# Patient Record
Sex: MALE | URBAN - METROPOLITAN AREA
[De-identification: names, ages, dates, MRNs, and addresses within clinical notes are randomized per-mention and may not be internally consistent; named-entity substitution may affect disease eponyms.]

---

## 2022-05-16 PROCEDURE — 81001 URINALYSIS AUTO W/SCOPE: CPT

## 2022-05-16 PROCEDURE — 87086 URINE CULTURE/COLONY COUNT: CPT

## 2022-05-17 ENCOUNTER — LAB REQUISITION (OUTPATIENT)
Dept: LAB | Facility: CLINIC | Age: 72
End: 2022-05-17

## 2022-05-17 LAB
ALBUMIN UR-MCNC: 70 MG/DL
APPEARANCE UR: ABNORMAL
BACTERIA #/AREA URNS HPF: ABNORMAL /HPF
BILIRUB UR QL STRIP: NEGATIVE
COLOR UR AUTO: YELLOW
GLUCOSE UR STRIP-MCNC: NEGATIVE MG/DL
HGB UR QL STRIP: ABNORMAL
KETONES UR STRIP-MCNC: NEGATIVE MG/DL
LEUKOCYTE ESTERASE UR QL STRIP: ABNORMAL
MUCOUS THREADS #/AREA URNS LPF: PRESENT /LPF
NITRATE UR QL: NEGATIVE
PH UR STRIP: 6 [PH] (ref 5–7)
RBC URINE: 20 /HPF
SP GR UR STRIP: 1.01 (ref 1–1.03)
SQUAMOUS EPITHELIAL: 1 /HPF
UROBILINOGEN UR STRIP-MCNC: <2 MG/DL
WBC CLUMPS #/AREA URNS HPF: PRESENT /HPF
WBC URINE: >182 /HPF

## 2022-05-18 LAB — BACTERIA UR CULT: ABNORMAL

## 2023-01-01 ENCOUNTER — APPOINTMENT (OUTPATIENT)
Dept: GENERAL RADIOLOGY | Facility: CLINIC | Age: 73
End: 2023-01-01
Attending: EMERGENCY MEDICINE
Payer: COMMERCIAL

## 2023-01-01 ENCOUNTER — TRANSFERRED RECORDS (OUTPATIENT)
Dept: HEALTH INFORMATION MANAGEMENT | Facility: CLINIC | Age: 73
End: 2023-01-01

## 2023-01-01 ENCOUNTER — HOSPITAL ENCOUNTER (EMERGENCY)
Facility: CLINIC | Age: 73
End: 2023-06-08
Attending: EMERGENCY MEDICINE | Admitting: EMERGENCY MEDICINE
Payer: COMMERCIAL

## 2023-01-01 DIAGNOSIS — I46.9 CARDIAC ARREST (H): ICD-10-CM

## 2023-01-01 LAB
COHGB MFR BLD: 45 % (ref 92–100)
HCO3 BLDA-SCNC: 30 MMOL/L (ref 21–28)
HCO3 BLDV-SCNC: 28 MMOL/L (ref 21–28)
LACTATE BLD-SCNC: 9.2 MMOL/L
LACTATE BLD-SCNC: 9.5 MMOL/L
PCO2 BLDA: 126 MM HG (ref 35–45)
PCO2 BLDV: 118 MM HG (ref 40–50)
PH BLDA: 6.98 [PH] (ref 7.35–7.45)
PH BLDV: 6.99 [PH] (ref 7.32–7.43)
PO2 BLDA: 40 MM HG (ref 80–105)
PO2 BLDV: 25 MM HG (ref 25–47)
SAO2 % BLDV: 22 % (ref 94–100)

## 2023-01-01 PROCEDURE — 999N000158 HC STATISTIC RCP TIME ED VENT EA 10 MIN

## 2023-01-01 PROCEDURE — 999N000179 XR SURGERY CARM FLUORO LESS THAN 5 MIN W STILLS: Mod: TC

## 2023-01-01 PROCEDURE — 82803 BLOOD GASES ANY COMBINATION: CPT | Mod: 91

## 2023-01-01 PROCEDURE — 99291 CRITICAL CARE FIRST HOUR: CPT | Mod: 25

## 2023-01-01 PROCEDURE — 31500 INSERT EMERGENCY AIRWAY: CPT

## 2023-01-01 PROCEDURE — 82803 BLOOD GASES ANY COMBINATION: CPT

## 2023-01-01 ASSESSMENT — ACTIVITIES OF DAILY LIVING (ADL)
ADLS_ACUITY_SCORE: 35

## 2023-06-08 NOTE — ED PROVIDER NOTES
History   Chief Complaint:  Cardiac Arrest     HPI    Everett Gutierrez is a 73 year old male who presents with cardiac arrest. The patient was playing cards at his senior living facility when he went down. Someone saw him go down, unknown if bystander CPR was completed. Down time was approximately less than 20 minutes. Patient received three shocks, last pulse check was no pulse 1.5 minutes PTA. ECMO was activated in the field. Patient was given three shocks and 450 mg of amiodarone while en route with EMS. Patient was in PEA. Patient had Airq airway device in place. No epinephrine was given while en route. KARTHIKEYAN in place. Dispatch call was at 1300.    Independent Historian:    EMS    Review of External Notes:  N/A    Medications:    No current outpatient medications on file.      Past Medical History:    No past medical history on file.    Past Surgical History:    No past surgical history on file.       Physical Exam   No data found.     Physical Exam    GENERAL: unresponsive, being bagged  HEAD: atraumatic  EYES: Dilated pupils unreactive  ENT:  mucus membranes moist  NECK:  trachea midline  RESPIRATORY: mildly cyanotic  CVS: no pulse, KARTHIKEYAN device in place  ABDOMEN: soft, nontender, nondistention  MUSCULOSKELETAL: no deformities  SKIN: warm and dry, no acute rashes or ulceration  NEURO: GCS of 3  PSYCH:   Unresponsive  Emergency Department Course   Imaging:  XR Surgery MARTELL L/T 5 Min Fluoro w Stills   Final Result        Report per radiology    Laboratory:  Labs Ordered and Resulted from Time of ED Arrival to Time of ED Departure   ISTAT GASES LACTATE VENOUS POCT - Abnormal       Result Value    Lactic Acid POCT 9.2 (*)     Bicarbonate Venous POCT 28      O2 Sat, Venous POCT 22 (*)     pCO2 Venous POCT 118 (*)     pH Venous POCT 6.99 (*)     pO2 Venous POCT 25     ISTAT GASES LACTATE ARTERIAL POCT - Abnormal    Bicarbonate Arterial POCT 30 (*)     Lactic Acid POCT 9.5 (*)     O2 Sat, Arterial POCT 45 (*)     pCO2  Arterial POCT 126 (*)     pH Arterial POCT 6.98 (*)     pO2 Arterial POCT 40 (*)    LACTIC ACID WHOLE BLOOD   LACTIC ACID WHOLE BLOOD        -Intubation    Date/Time: 2023 1:44 PM    Performed by: Christopher Dubose MD  Authorized by: Christopher Dubose MD    Emergent condition/consent implied    Pre-procedure details:     Indications: cardio/pulmonary arrest      Patient status:  Unresponsive    Look externally: no concerns    Procedure details:     Number of attempts:  1  Successful intubation attempt details:     Intubation method:  Oral    Intubation technique: video assisted      Laryngoscope blade:  Lewis 3    Tube size (mm):  7.5    Tube type:  Cuffed    Tube visualized through cords: yes    Placement assessment:     Tube secured with:  ETT hercules and adhesive tape  Post-procedure details:     Procedure completion:  Patient tolerated the procedure well with no immediate complications    PROCEDURE    Patient Tolerance:  Patient tolerated the procedure well with no immediate complications           Emergency Department Course & Assessments:    Interventions:  Medications - No data to display     Assessments:  1340    EMS arrival  1342    Epinephrine   1342    Bicarbonate  1344    Epinephrine  1344 Intubation  1345    Lactic acid iSTAT 9.1  1350    Epinephrine  1354    Bicarbonate  1358    Time of death     Independent Interpretation (X-rays, CTs, rhythm strip):  None    Consultations/Discussion of Management or Tests:  1344    Dr. Juno Phillips of the ECMO team presented, attempted ECMO procedure. Time of death was called 1358.    Disposition:  Patient   Impression & Plan    Medical Decision Making:  Patient presents in cardiac arrest.  It was an unwitnessed arrest.  He was shocked 3 times and given amiodarone prior to arrival.  He has been in a PEA rhythm ever since without regaining pulse.  The ECMO team was activated.  Patient was quite acidotic with a low pH and given length of time and unknown  downtime was not an ECMO candidate.  He had been given resuscitative over efforts for an hour without return of life.  He did change out his airway and put in a 7.5 ET tube.  He received epinephrine bicarb and calcium without effect.  We tried several times contacting family double checking registration as there is no records of him.  We have been unable to reach family despite multiple attempts.    Critical Care time:  was 37 minutes for this patient excluding procedures.    Diagnosis:    ICD-10-CM    1. Cardiac arrest (H)  I46.9          Scribe Disclosure:  Cookie PALMER Hired, am serving as a scribe at 2:27 PM on 6/8/2023 to document services personally performed by Christopher Dubose MD based on my observations and the provider's statements to me.  6/8/2023   Christopher Dubose MD Adams, Shaun L, MD  06/08/23 [id number]

## 2023-06-08 NOTE — ED NOTES
The writer talked to a  at his residence who stated the pt wife is named Alexa Prasad whose number is [phone].  This was previosuly thought to be the number of the pt's daughter.  Pt's daughter is named Aditi Arora and resided in [place].

## 2023-06-08 NOTE — ED NOTES
EMS provided name of pt's daughter as Criselda Arora at [phone].  This number has been called several times by the writer and others and no one has answers.  The name on the answering message does not sound like Criselda Arora.  EMS stated the address the pt was brought from is [address] [place] [address].  EMS stated the pt's name and birthday were confirmed by PD.  ME and [facility] called by the writer and provided with available information.

## 2023-06-08 NOTE — ED NOTES
I called and spoke with patient's spouse who was told earlier by the medical examiner that her  has . I told her she would be contacted by [facility] and likely our ANS Jyothi.